# Patient Record
Sex: MALE | Race: OTHER
[De-identification: names, ages, dates, MRNs, and addresses within clinical notes are randomized per-mention and may not be internally consistent; named-entity substitution may affect disease eponyms.]

---

## 2018-01-01 ENCOUNTER — HOSPITAL ENCOUNTER (EMERGENCY)
Dept: HOSPITAL 72 - EMR | Age: 48
Discharge: HOME | End: 2018-01-01
Payer: MEDICAID

## 2018-01-01 VITALS — DIASTOLIC BLOOD PRESSURE: 67 MMHG | SYSTOLIC BLOOD PRESSURE: 117 MMHG

## 2018-01-01 VITALS — BODY MASS INDEX: 24.11 KG/M2 | HEIGHT: 66 IN | WEIGHT: 150 LBS

## 2018-01-01 DIAGNOSIS — R51: ICD-10-CM

## 2018-01-01 DIAGNOSIS — R19.7: ICD-10-CM

## 2018-01-01 DIAGNOSIS — J02.9: ICD-10-CM

## 2018-01-01 DIAGNOSIS — R05: ICD-10-CM

## 2018-01-01 DIAGNOSIS — R09.81: ICD-10-CM

## 2018-01-01 DIAGNOSIS — J06.9: Primary | ICD-10-CM

## 2018-01-01 PROCEDURE — 99283 EMERGENCY DEPT VISIT LOW MDM: CPT
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
